# Patient Record
Sex: FEMALE | NOT HISPANIC OR LATINO | Employment: UNEMPLOYED | URBAN - METROPOLITAN AREA
[De-identification: names, ages, dates, MRNs, and addresses within clinical notes are randomized per-mention and may not be internally consistent; named-entity substitution may affect disease eponyms.]

---

## 2017-02-18 ENCOUNTER — HOSPITAL ENCOUNTER (EMERGENCY)
Facility: HOSPITAL | Age: 42
Discharge: HOME | End: 2017-02-19
Attending: EMERGENCY MEDICINE | Admitting: EMERGENCY MEDICINE

## 2017-02-18 ENCOUNTER — APPOINTMENT (OUTPATIENT)
Dept: GENERAL RADIOLOGY | Facility: HOSPITAL | Age: 42
End: 2017-02-18

## 2017-02-18 DIAGNOSIS — R00.2 PALPITATIONS: Primary | ICD-10-CM

## 2017-02-18 DIAGNOSIS — R07.89 ATYPICAL CHEST PAIN: ICD-10-CM

## 2017-02-18 LAB
ALBUMIN SERPL-MCNC: 4.5 G/DL (ref 3.5–5.2)
ALBUMIN/GLOB SERPL: 1.4 G/DL
ALP SERPL-CCNC: 46 U/L (ref 39–117)
ALT SERPL W P-5'-P-CCNC: 13 U/L (ref 1–33)
ANION GAP SERPL CALCULATED.3IONS-SCNC: 12.5 MMOL/L
AST SERPL-CCNC: 18 U/L (ref 1–32)
BACTERIA UR QL AUTO: ABNORMAL /HPF
BASOPHILS # BLD AUTO: 0.05 10*3/MM3 (ref 0–0.2)
BASOPHILS NFR BLD AUTO: 0.7 % (ref 0–1.5)
BILIRUB SERPL-MCNC: 0.2 MG/DL (ref 0.1–1.2)
BILIRUB UR QL STRIP: NEGATIVE
BUN BLD-MCNC: 18 MG/DL (ref 6–20)
BUN/CREAT SERPL: 22.2 (ref 7–25)
CALCIUM SPEC-SCNC: 9.2 MG/DL (ref 8.6–10.5)
CHLORIDE SERPL-SCNC: 101 MMOL/L (ref 98–107)
CLARITY UR: CLEAR
CO2 SERPL-SCNC: 26.5 MMOL/L (ref 22–29)
COLOR UR: YELLOW
CREAT BLD-MCNC: 0.81 MG/DL (ref 0.57–1)
D DIMER PPP FEU-MCNC: 0.33 MCGFEU/ML (ref 0–0.49)
DEPRECATED RDW RBC AUTO: 41.2 FL (ref 37–54)
EOSINOPHIL # BLD AUTO: 0.13 10*3/MM3 (ref 0–0.7)
EOSINOPHIL NFR BLD AUTO: 1.9 % (ref 0.3–6.2)
ERYTHROCYTE [DISTWIDTH] IN BLOOD BY AUTOMATED COUNT: 12.3 % (ref 11.7–13)
GFR SERPL CREATININE-BSD FRML MDRD: 78 ML/MIN/1.73
GLOBULIN UR ELPH-MCNC: 3.3 GM/DL
GLUCOSE BLD-MCNC: 108 MG/DL (ref 65–99)
GLUCOSE BLDC GLUCOMTR-MCNC: 114 MG/DL (ref 70–130)
GLUCOSE UR STRIP-MCNC: NEGATIVE MG/DL
HCG SERPL QL: NEGATIVE
HCT VFR BLD AUTO: 38.6 % (ref 35.6–45.5)
HGB BLD-MCNC: 13.3 G/DL (ref 11.9–15.5)
HGB UR QL STRIP.AUTO: ABNORMAL
HYALINE CASTS UR QL AUTO: ABNORMAL /LPF
IMM GRANULOCYTES # BLD: 0 10*3/MM3 (ref 0–0.03)
IMM GRANULOCYTES NFR BLD: 0 % (ref 0–0.5)
KETONES UR QL STRIP: NEGATIVE
LEUKOCYTE ESTERASE UR QL STRIP.AUTO: NEGATIVE
LYMPHOCYTES # BLD AUTO: 2.21 10*3/MM3 (ref 0.9–4.8)
LYMPHOCYTES NFR BLD AUTO: 32.8 % (ref 19.6–45.3)
MCH RBC QN AUTO: 31.5 PG (ref 26.9–32)
MCHC RBC AUTO-ENTMCNC: 34.5 G/DL (ref 32.4–36.3)
MCV RBC AUTO: 91.5 FL (ref 80.5–98.2)
MONOCYTES # BLD AUTO: 0.3 10*3/MM3 (ref 0.2–1.2)
MONOCYTES NFR BLD AUTO: 4.5 % (ref 5–12)
NEUTROPHILS # BLD AUTO: 4.05 10*3/MM3 (ref 1.9–8.1)
NEUTROPHILS NFR BLD AUTO: 60.1 % (ref 42.7–76)
NITRITE UR QL STRIP: NEGATIVE
PH UR STRIP.AUTO: 7 [PH] (ref 5–8)
PLATELET # BLD AUTO: 323 10*3/MM3 (ref 140–500)
PMV BLD AUTO: 10.1 FL (ref 6–12)
POTASSIUM BLD-SCNC: 4 MMOL/L (ref 3.5–5.2)
PROT SERPL-MCNC: 7.8 G/DL (ref 6–8.5)
PROT UR QL STRIP: NEGATIVE
RBC # BLD AUTO: 4.22 10*6/MM3 (ref 3.9–5.2)
RBC # UR: ABNORMAL /HPF
REF LAB TEST METHOD: ABNORMAL
SODIUM BLD-SCNC: 140 MMOL/L (ref 136–145)
SP GR UR STRIP: 1.01 (ref 1–1.03)
SQUAMOUS #/AREA URNS HPF: ABNORMAL /HPF
TROPONIN T SERPL-MCNC: <0.01 NG/ML (ref 0–0.03)
TROPONIN T SERPL-MCNC: <0.01 NG/ML (ref 0–0.03)
UROBILINOGEN UR QL STRIP: ABNORMAL
WBC NRBC COR # BLD: 6.74 10*3/MM3 (ref 4.5–10.7)
WBC UR QL AUTO: ABNORMAL /HPF

## 2017-02-18 PROCEDURE — 82962 GLUCOSE BLOOD TEST: CPT

## 2017-02-18 PROCEDURE — 93005 ELECTROCARDIOGRAM TRACING: CPT | Performed by: PHYSICIAN ASSISTANT

## 2017-02-18 PROCEDURE — 85379 FIBRIN DEGRADATION QUANT: CPT | Performed by: PHYSICIAN ASSISTANT

## 2017-02-18 PROCEDURE — 36415 COLL VENOUS BLD VENIPUNCTURE: CPT

## 2017-02-18 PROCEDURE — 81001 URINALYSIS AUTO W/SCOPE: CPT | Performed by: EMERGENCY MEDICINE

## 2017-02-18 PROCEDURE — 85025 COMPLETE CBC W/AUTO DIFF WBC: CPT | Performed by: EMERGENCY MEDICINE

## 2017-02-18 PROCEDURE — 99284 EMERGENCY DEPT VISIT MOD MDM: CPT

## 2017-02-18 PROCEDURE — 96374 THER/PROPH/DIAG INJ IV PUSH: CPT

## 2017-02-18 PROCEDURE — 93005 ELECTROCARDIOGRAM TRACING: CPT

## 2017-02-18 PROCEDURE — 71020 HC CHEST PA AND LATERAL: CPT

## 2017-02-18 PROCEDURE — 25010000002 LORAZEPAM PER 2 MG: Performed by: EMERGENCY MEDICINE

## 2017-02-18 PROCEDURE — 80053 COMPREHEN METABOLIC PANEL: CPT | Performed by: EMERGENCY MEDICINE

## 2017-02-18 PROCEDURE — 36415 COLL VENOUS BLD VENIPUNCTURE: CPT | Performed by: EMERGENCY MEDICINE

## 2017-02-18 PROCEDURE — 93010 ELECTROCARDIOGRAM REPORT: CPT | Performed by: INTERNAL MEDICINE

## 2017-02-18 PROCEDURE — 84484 ASSAY OF TROPONIN QUANT: CPT | Performed by: PHYSICIAN ASSISTANT

## 2017-02-18 PROCEDURE — 84703 CHORIONIC GONADOTROPIN ASSAY: CPT | Performed by: EMERGENCY MEDICINE

## 2017-02-18 RX ORDER — LORAZEPAM 2 MG/ML
0.5 INJECTION INTRAMUSCULAR ONCE
Status: COMPLETED | OUTPATIENT
Start: 2017-02-18 | End: 2017-02-18

## 2017-02-18 RX ADMIN — LORAZEPAM 0.5 MG: 2 INJECTION INTRAMUSCULAR; INTRAVENOUS at 23:02

## 2017-02-19 ENCOUNTER — APPOINTMENT (OUTPATIENT)
Dept: CARDIOLOGY | Facility: HOSPITAL | Age: 42
End: 2017-02-19

## 2017-02-19 VITALS
TEMPERATURE: 97.3 F | RESPIRATION RATE: 16 BRPM | DIASTOLIC BLOOD PRESSURE: 78 MMHG | HEART RATE: 75 BPM | OXYGEN SATURATION: 100 % | SYSTOLIC BLOOD PRESSURE: 100 MMHG | BODY MASS INDEX: 28.47 KG/M2 | WEIGHT: 145 LBS | HEIGHT: 60 IN

## 2017-02-19 PROCEDURE — 0296T HC EXT ECG > 48HR TO 21 DAY RCRD W/CONECT INTL RCRD: CPT

## 2017-02-19 PROCEDURE — 96361 HYDRATE IV INFUSION ADD-ON: CPT

## 2017-02-19 RX ADMIN — SODIUM CHLORIDE 1000 ML: 9 INJECTION, SOLUTION INTRAVENOUS at 00:02

## 2017-02-19 NOTE — ED PROVIDER NOTES
" EMERGENCY DEPARTMENT ENCOUNTER    CHIEF COMPLAINT  Chief Complaint: chest pain  History given by: patient  History limited by: none  Room Number: 03/03  PMD: No Known Provider      HPI:  Pt is a 41 y.o. female who presents complaining of lower central chest pain which radiates to her back onset today while shopping at the mall today around 5:30 PM with shortness of breath and fast palpitations. She intermittently has some numbness in her distal upper extremities as well. She describes the pain as \"pressure\" and is worse with deep breathing. She states that she also feels generally weak. EMS reported that the pt was hyperventilating on scene. Pt's only home medication is Progesterone.    Duration:  4 hours  Onset: gradual  Timing: intermittent  Location: lower central chest  Radiation: back  Quality: \"pressure\"  Intensity/Severity: severe  Progression: waxing and waning  Associated Symptoms: shortness of breath, numbness distal BUE, generally weak, palpitations  Aggravating Factors: deep breathing  Alleviating Factors: none stated  Previous Episodes: none  Treatment before arrival: none stated    PAST MEDICAL HISTORY  Active Ambulatory Problems     Diagnosis Date Noted   • No Active Ambulatory Problems     Resolved Ambulatory Problems     Diagnosis Date Noted   • No Resolved Ambulatory Problems     No Additional Past Medical History       PAST SURGICAL HISTORY  History reviewed. No pertinent past surgical history.    FAMILY HISTORY  History reviewed. No pertinent family history.    SOCIAL HISTORY  Social History     Social History   • Marital status:      Spouse name: N/A   • Number of children: N/A   • Years of education: N/A     Occupational History   • Not on file.     Social History Main Topics   • Smoking status: Not on file   • Smokeless tobacco: Not on file   • Alcohol use Not on file   • Drug use: Not on file   • Sexual activity: Not on file     Other Topics Concern   • Not on file     Social History " Narrative   • No narrative on file       ALLERGIES  Review of patient's allergies indicates no known allergies.    REVIEW OF SYSTEMS  Review of Systems   Constitutional: Negative for chills and fever.   HENT: Negative for congestion and sore throat.    Eyes: Negative.    Respiratory: Positive for shortness of breath. Negative for cough.    Cardiovascular: Positive for chest pain and palpitations (HR in the 150s at EMS arrival per pt).   Gastrointestinal: Negative for abdominal pain, diarrhea and vomiting.   Genitourinary: Negative for dysuria.   Musculoskeletal: Negative for neck pain.   Skin: Negative for rash.   Allergic/Immunologic: Negative.    Neurological: Positive for numbness (distal BUE). Negative for weakness and headaches.   Hematological: Negative.    Psychiatric/Behavioral: The patient is nervous/anxious.    All other systems reviewed and are negative.      PHYSICAL EXAM  ED Triage Vitals   Temp Heart Rate Resp BP SpO2   02/18/17 1828 02/18/17 1828 02/18/17 1828 02/18/17 1828 02/18/17 1828   97.7 °F (36.5 °C) 89 16 120/74 96 %      Temp src Heart Rate Source Patient Position BP Location FiO2 (%)   -- 02/18/17 2049 02/18/17 2049 02/18/17 2049 --    Monitor Lying Right arm        Physical Exam   Constitutional: She is oriented to person, place, and time and well-developed, well-nourished, and in no distress.   Eyes: EOM are normal.   Neck: Normal range of motion.   Cardiovascular: Regular rhythm.  Tachycardia present.    Pulmonary/Chest: Effort normal and breath sounds normal. Tachypnea noted. No respiratory distress.   Neurological: She is alert and oriented to person, place, and time. She has normal sensation and normal strength.   Skin: Skin is warm and dry.   Psychiatric: Affect normal. Her mood appears anxious.   Nursing note and vitals reviewed.      LAB RESULTS  Lab Results (last 24 hours)     Procedure Component Value Units Date/Time    CBC & Differential [[id number]] Collected:  02/18/17 1933     Specimen:  Blood Updated:  02/18/17 2004    Narrative:       The following orders were created for panel order CBC & Differential.  Procedure                               Abnormality         Status                     ---------                               -----------         ------                     CBC Auto Differential[[id number]]         Abnormal            Final result                 Please view results for these tests on the individual orders.    Comprehensive Metabolic Panel [[id number]]  (Abnormal) Collected:  02/18/17 1933    Specimen:  Blood Updated:  02/18/17 2031     Glucose 108 (H) mg/dL      BUN 18 mg/dL      Creatinine 0.81 mg/dL      Sodium 140 mmol/L      Potassium 4.0 mmol/L      Chloride 101 mmol/L      CO2 26.5 mmol/L      Calcium 9.2 mg/dL      Total Protein 7.8 g/dL      Albumin 4.50 g/dL      ALT (SGPT) 13 U/L      AST (SGOT) 18 U/L      Alkaline Phosphatase 46 U/L      Total Bilirubin 0.2 mg/dL      eGFR Non African Amer 78 mL/min/1.73      Globulin 3.3 gm/dL      A/G Ratio 1.4 g/dL      BUN/Creatinine Ratio 22.2      Anion Gap 12.5 mmol/L     hCG, Serum, Qualitative [[id number]]  (Normal) Collected:  02/18/17 1933    Specimen:  Blood Updated:  02/18/17 2031     HCG Qualitative Negative     CBC Auto Differential [[id number]]  (Abnormal) Collected:  02/18/17 1933    Specimen:  Blood Updated:  02/18/17 2004     WBC 6.74 10*3/mm3      RBC 4.22 10*6/mm3      Hemoglobin 13.3 g/dL      Hematocrit 38.6 %      MCV 91.5 fL      MCH 31.5 pg      MCHC 34.5 g/dL      RDW 12.3 %      RDW-SD 41.2 fl      MPV 10.1 fL      Platelets 323 10*3/mm3      Neutrophil % 60.1 %      Lymphocyte % 32.8 %      Monocyte % 4.5 (L) %      Eosinophil % 1.9 %      Basophil % 0.7 %      Immature Grans % 0.0 %      Neutrophils, Absolute 4.05 10*3/mm3      Lymphocytes, Absolute 2.21 10*3/mm3      Monocytes, Absolute 0.30 10*3/mm3      Eosinophils, Absolute 0.13 10*3/mm3      Basophils, Absolute 0.05 10*3/mm3      Immature Grans,  Absolute 0.00 10*3/mm3     Troponin [[id number]]  (Normal) Collected:  02/18/17 1933    Specimen:  Blood Updated:  02/18/17 2122     Troponin T <0.010 ng/mL     Narrative:       Troponin T Reference Ranges:  Less than 0.03 ng/mL:    Negative for AMI  0.03 to 0.09 ng/mL:      Indeterminant for AMI  Greater than 0.09 ng/mL: Positive for AMI    Urinalysis With / Culture If Indicated [[id number]]  (Abnormal) Collected:  02/18/17 2057    Specimen:  Urine from Urine, Clean Catch Updated:  02/18/17 2110     Color, UA Yellow      Appearance, UA Clear      pH, UA 7.0      Specific Gravity, UA 1.009      Glucose, UA Negative      Ketones, UA Negative      Bilirubin, UA Negative      Blood, UA Moderate (2+) (A)      Protein, UA Negative      Leuk Esterase, UA Negative      Nitrite, UA Negative      Urobilinogen, UA 0.2 E.U./dL     Urinalysis, Microscopic Only [[id number]]  (Abnormal) Collected:  02/18/17 2057    Specimen:  Urine from Urine, Clean Catch Updated:  02/18/17 2110     RBC, UA 0-2 (A) /HPF      WBC, UA 0-2 (A) /HPF      Bacteria, UA None Seen /HPF      Squamous Epithelial Cells, UA 0-2 /HPF      Hyaline Casts, UA None Seen /LPF      Methodology Automated Microscopy     POC Glucose Fingerstick [[id number]]  (Normal) Collected:  02/18/17 2132    Specimen:  Blood Updated:  02/18/17 2133     Glucose 114 mg/dL     Narrative:       Meter: [id number] : [id number] [facility]    D-dimer, Quantitative [[id number]]  (Normal) Collected:  02/18/17 2150    Specimen:  Blood Updated:  02/18/17 2218     D-Dimer, Quantitative 0.33 MCGFEU/mL     Narrative:       The Stago D-Dimer test used in conjunction with a clinical pretest probability (PTP) assessment model, has been approved by the FDA to rule out the presence of venous thromboembolism (VTE) in outpatients suspected of deep venous thrombosis (DVT) or pulmonary embolism (PE).     Troponin [[id number]]  (Normal) Collected:  02/18/17 2219    Specimen:  Blood from Arm, Right  Updated:  02/18/17 2248     Troponin T <0.010 ng/mL     Narrative:       Troponin T Reference Ranges:  Less than 0.03 ng/mL:    Negative for AMI  0.03 to 0.09 ng/mL:      Indeterminant for AMI  Greater than 0.09 ng/mL: Positive for AMI          I ordered the above labs and reviewed the results    RADIOLOGY  XR Chest 2 View   Final Result   Negative acute disease.      I ordered the above noted radiological studies. Interpreted by radiologist. Reviewed by me in PACS.       PROCEDURES  Procedures    EKG         EKG time: 6:52 PM  Rhythm/Rate: Sinus arrhythmia, rate 78  P waves and MS: normal  QRS, axis: normal QT   ST and T waves: normal   Interpreted Contemporaneously by me, independently viewed  No prior EKG available for comparison    EKG         EKG time: 9:27 PM  Rhythm/Rate: NSR, rate 91  P waves and MS: normal  QRS, axis: normal   ST and T waves: normal   Interpreted Contemporaneously by me, independently viewed  Unchanged compared to prior this date      PROGRESS AND CONSULTS  ED Course     9:02 PM: Pt is tachycardic in the 120s and 130s upon entrance to exam room. Second EKG was ordered however by the time she was hooked up, HR had returned to the 90s and 100s. Will continue to monitor. Will order labs including troponin and d-dimer for further evaluation.    10:04 PM: Pt rechecked, she is resting comfortably at the moment. HR in the 80s, sats 99% and latest /84. Informed her lab results are pending, however initial troponin is negatvie and EKG shows no acute changes.    10:57 PM: Discussed the case with assigned attending Dr. Williamson who agrees with the plan of care, likely dx of panic attack.    11:28 PM: Pt rechecked, she is sleeping at current. HR in the 60s and 70s. She had improvement after Ativan. Discussed negative workup and intent for discharge with Holter monitor for further analysis. Pt and family understand and agree with the plan. All questions answered.    11:40 PM: Discussed the case with  assigned attending Dr. Williamson. He agrees with plan for discharge, will d/c after he evaluates the pt.      MEDICAL DECISION MAKING  Results were reviewed/discussed with the patient and they were also made aware of online access. Pt also made aware that some labs, such as cultures, will not be resulted during ER visit and follow up with PMD is necessary.     MDM  Number of Diagnoses or Management Options     Amount and/or Complexity of Data Reviewed  Clinical lab tests: ordered and reviewed (Initial and second troponin are both negative, d-dimer WNL.)  Tests in the radiology section of CPT®: reviewed and ordered (CXR shows no acute disease)  Tests in the medicine section of CPT®: ordered and reviewed  Independent visualization of images, tracings, or specimens: yes    Patient Progress  Patient progress: stable         DIAGNOSIS  Final diagnoses:   Palpitations   Atypical chest pain       DISPOSITION  DISCHARGED @ 12:31 AM    Patient discharged in stable condition.    Reviewed implications of results, diagnosis, meds, responsibility to follow up, warning signs and symptoms of possible worsening, potential complications and reasons to return to ER.    Patient/Family voiced understanding of above instructions.    Discussed plan for discharge, as there is no emergent indication for admission.  Pt/family is agreeable and understands need for follow up and repeat testing.  Pt is aware that discharge does not mean that nothing is wrong but it indicates no emergency is present that requires admission and they must continue care with follow-up as given below or physician of their choice.     FOLLOW-UP  Vignesh Corbett MD  [address] [id number] [phone]    Schedule an appointment as soon as possible for a visit  For further evaluation and treatment       Medication List      Notice     No changes were made to your prescriptions during this visit.      Latest Documented Vital Signs:  As of 2:28  AM  BP- 100/78 HR- 75 Temp- 97.3 °F (36.3 °C) (Tympanic) O2 sat- 100%    --  Documentation assistance provided by lauren Melo III, PA for Glenn Melo PA-C.  Information recorded by the scribe was done at my direction and has been verified and validated by me.       Davi Hanson  02/19/17 0031       RAMIREZ Morris III  02/19/17 0228

## 2017-02-19 NOTE — ED PROVIDER NOTES
The patient presents complaining of chest pain and palpitations. Pt states that the symptoms have since resolved. Pt reports SOA. Pt denies a hx of anxiety and panic attacks. Pt denies abdominal pain.    Pt is resting comfortably and is in no distress with no gross neuro deficits.  Patient is nontoxic appearing   Lungs/cardiovascular: Lungs: clear Heart: within normal limits without murmur  Abdomen: Soft, non tender  Back/extremities: Calves are without swelling and non tender      I supervised care provided by the midlevel provider.  We have discussed this patient's history, physical exam, and treatment plan.  I have reviewed the note and personally saw and examined the patient and agree with the plan of care.    Entered by David Ron, acting as scribe for Usman Williamson MD.         David Ron  02/19/17 0013       Usman Williamson MD  02/19/17 0234

## 2017-02-19 NOTE — ED NOTES
Pt c/o feeling like her heart was racing and then she became dizzy, SOA, had chest pain, and bilateral upper extremities and head felt numb. Pt states that last about 10 minutes and occurred while she was walking around the mall. Pt denies any symptoms at this time.     Kati Russo RN  02/18/17 2051

## 2017-02-19 NOTE — DISCHARGE INSTRUCTIONS
Wear 24 hour holter monitor.  Call and follow up with Dr Corbett to review results.  Avoid Caffeine until resolve of all symptoms.  Return to the ER with any further concerns or should your symptoms return.

## 2017-02-24 ENCOUNTER — APPOINTMENT (OUTPATIENT)
Dept: LAB | Facility: HOSPITAL | Age: 42
End: 2017-02-24

## 2017-02-24 ENCOUNTER — OFFICE VISIT (OUTPATIENT)
Dept: CARDIOLOGY | Facility: CLINIC | Age: 42
End: 2017-02-24

## 2017-02-24 VITALS
HEIGHT: 62 IN | DIASTOLIC BLOOD PRESSURE: 72 MMHG | SYSTOLIC BLOOD PRESSURE: 118 MMHG | WEIGHT: 153.6 LBS | HEART RATE: 66 BPM | BODY MASS INDEX: 28.26 KG/M2

## 2017-02-24 DIAGNOSIS — R01.1 HEART MURMUR: ICD-10-CM

## 2017-02-24 DIAGNOSIS — R06.02 SHORTNESS OF BREATH: ICD-10-CM

## 2017-02-24 DIAGNOSIS — R00.0 TACHYCARDIA: ICD-10-CM

## 2017-02-24 DIAGNOSIS — R07.89 OTHER CHEST PAIN: ICD-10-CM

## 2017-02-24 DIAGNOSIS — R00.2 PALPITATIONS: Primary | ICD-10-CM

## 2017-02-24 LAB
T4 FREE SERPL-MCNC: 1.03 NG/DL (ref 0.93–1.7)
TSH SERPL DL<=0.05 MIU/L-ACNC: 2.58 MIU/ML (ref 0.27–4.2)

## 2017-02-24 PROCEDURE — 84439 ASSAY OF FREE THYROXINE: CPT | Performed by: NURSE PRACTITIONER

## 2017-02-24 PROCEDURE — 84443 ASSAY THYROID STIM HORMONE: CPT | Performed by: NURSE PRACTITIONER

## 2017-02-24 PROCEDURE — 93000 ELECTROCARDIOGRAM COMPLETE: CPT | Performed by: NURSE PRACTITIONER

## 2017-02-24 PROCEDURE — 99204 OFFICE O/P NEW MOD 45 MIN: CPT | Performed by: NURSE PRACTITIONER

## 2017-02-24 NOTE — PROGRESS NOTES
Date of Office Visit: 2017  Encounter Provider: JONNIE Spear  Place of Service: [facility] CARDIOLOGY  Patient Name: Mile Welsh  :1975    Chief Complaint   Patient presents with   • Chest Pain     ER FOLLOW UP   • Shortness of Breath   • Palpitations   :     HPI: Mile Welsh is a 42 y.o. female who presents today for evaluation of chest pain, shortness of breath, and palpitations.  The patient had been at the shopping mall and developed these symptoms.  She also reported some intermittent numbness or distal upper extremities.  She described the chest pain as pressure and it worsened with deep breathing.  She was feeling generally weak.  EMS reported the patient started hyperventilating on the scene and that her heart rate was in the 150s.  The patient was taken to [facility] emergency department for further evaluation.    Upon review of the emergency department records, her blood pressure was 120/74 and heart rate 89 bpm.  Comprehensive metabolic panel was within normal limits.  Her CBC was within normal limits and troponin level.  She was noted to have a moderate amount of blood in her urine analysis.  Quantitative d-dimer was negative.  Her EKG was interpreted as sinus arrhythmia with a heart rate of 78 bpm and another one was completed which showed normal sinus rhythm with heart rate of 91 bpm.  The ED physician noted the patient was tachycardic with heart rates in the 120s and 130s upon arrival.  Her chest x-ray was normal.  Her heart rate decreased to the 80s and her blood pressure was 118/84.  Her symptoms were felt likely secondary to a panic attack and she was given Ativan.  She was discharged on a Zio patch.    The patient presents a for follow-up.  She said the only thing different that morning was that she drink a strong cup of coffee from [employer].  She did feel that her heartbeat was aggravated the entire day.  She was just shopping and  developed tachycardia accompanied with mild chest pain, shortness of breath, lightheadedness, and her body felt numb.  I reviewed the EMS strips and they were not able to capture a rhythm strip with her heart rate in the 140s and 150s.  I did see a rhythm strip with a heart rate in the 120s.  The patient states that this is never happened to her before.  She denied any use of decongestants or illicit drug use.  She states that she does have a history of thyroid issues.    She denies any further palpitations.  She says she's just felt fatigued since the incident.  She denies chest pain, shortness of air, paroxysmal nocturnal dyspnea, orthopnea, cough, edema, dizziness, or syncope.      Past Medical History   Diagnosis Date   • Murmur    • Palpitations      2/18/2017   • Thyroid disease        Past Surgical History   Procedure Laterality Date   • No past surgeries         Social History     Social History   • Marital status:      Spouse name: N/A   • Number of children: N/A   • Years of education: N/A     Occupational History   • Not on file.     Social History Main Topics   • Smoking status: Former Smoker   • Smokeless tobacco: Not on file      Comment: she just occ smoked over the years when she felt she needed it   • Alcohol use No   • Drug use: No   • Sexual activity: Not on file     Other Topics Concern   • Not on file     Social History Narrative       Family History   Problem Relation Age of Onset   • No Known Problems Mother    • No Known Problems Father        Review of Systems   Constitution: Negative for chills, diaphoresis, fever, malaise/fatigue, night sweats, weight gain and weight loss.   HENT: Negative for hearing loss, nosebleeds, sore throat and tinnitus.    Eyes: Negative for blurred vision, double vision, pain and visual disturbance.   Cardiovascular: Positive for chest pain and palpitations. Negative for claudication, cyanosis, dyspnea on exertion, irregular heartbeat, leg swelling,  "near-syncope, orthopnea, paroxysmal nocturnal dyspnea and syncope.   Respiratory: Negative for cough, hemoptysis, shortness of breath, snoring and wheezing.    Endocrine: Negative for cold intolerance, heat intolerance and polyuria.   Hematologic/Lymphatic: Negative for bleeding problem. Does not bruise/bleed easily.   Skin: Negative for color change, dry skin, flushing and itching.   Musculoskeletal: Negative for falls, joint pain, joint swelling, muscle cramps, muscle weakness and myalgias.   Gastrointestinal: Negative for abdominal pain, constipation, heartburn, melena, nausea and vomiting.   Genitourinary: Negative for dysuria and hematuria.   Neurological: Negative for excessive daytime sleepiness, dizziness, light-headedness, loss of balance, numbness, paresthesias, seizures and vertigo.   Psychiatric/Behavioral: Negative for altered mental status, depression, memory loss and substance abuse. The patient does not have insomnia and is not nervous/anxious.    Allergic/Immunologic: Negative for environmental allergies.       No Known Allergies      Current Outpatient Prescriptions:   •  PROGESTERONE IM, Inject  into the shoulder, thigh, or buttocks., Disp: , Rfl:      Objective:     Vitals:    02/24/17 1108 02/24/17 1112   BP: 118/72 118/72   BP Location: Left arm Right arm   Pulse: 66    Weight: 153 lb 9.6 oz (69.7 kg)    Height: 62\" (157.5 cm)      Body mass index is 28.09 kg/(m^2).    PHYSICAL EXAM:    Vitals Reviewed.   General Appearance: No acute distress, well developed and well nourished.   Eyes: Conjunctiva and lids: No erythema, swelling, or discharge. Sclera non-icteric.   HENT: Atraumatic, normocephalic. External eyes, ears, and nose normal. No hearing loss noted. Mucous membranes normal. Lips not cyanotic. Neck supple with no tenderness.  Respiratory: No signs of respiratory distress. Respiration rhythm and depth normal.   Clear to auscultation. No rales, crackles, rhonchi, or wheezing auscultated. "   Cardiovascular:  Jugular Venous Pressure: Normal  Heart Rate and Rhythm: Normal, Heart Sounds: Normal S1 and S2. No S3 or S4 noted.  Murmurs: LUSB grade 1/6 murmur noted. No rubs, thrills, or gallops.   Arterial Pulses: Carotid pulses normal. No carotid bruit noted. Posterior tibialis and dorsalis pedis pulses normal.   Lower Extremities: No edema noted.  Gastrointestinal:  Abdomen soft, non-distended, non-tender. Normal bowel sounds. No hepatomegaly.   Musculoskeletal: Normal movement of extremities  Skin and Nails: General appearance normal. No pallor, cyanosis, diaphoresis. Skin temperature normal. No clubbing of fingernails.   Psychiatric: Patient alert and oriented to person, place, and time. Speech and behavior appropriate. Normal mood and affect.       ECG 12 Lead  Date/Time: 2/24/2017 11:12 AM  Performed by: MISHA ANDUJAR  Authorized by: MISHA ANDUJAR   Comparison: compared with previous ECG from 2/18/2017  Similar to previous ECG  Rhythm: sinus rhythm  Rate: normal  BPM: 66  Conduction: conduction normal  ST Segments: ST segments normal  T Waves: T waves normal  QRS axis: normal  Other: no other findings  Clinical impression: normal ECG              Assessment:       Diagnosis Plan   1. Palpitations  ECG 12 Lead    Adult Transthoracic Echo Complete    TSH    T4, Free   2. Tachycardia  ECG 12 Lead    Adult Transthoracic Echo Complete    TSH    T4, Free   3. Other chest pain  ECG 12 Lead    Adult Transthoracic Echo Complete    TSH    T4, Free   4. Shortness of breath  ECG 12 Lead    Adult Transthoracic Echo Complete    TSH    T4, Free   5. Heart murmur  TSH    T4, Free          Plan:       1.  Palpitations: The patient states that she had one episode of palpitations and was told initially that her heart rate was in the 140s and 150s.  She does been walking around the mall.  She felt the tachycardia, and mild chest pain, shortness of breath, lightheadedness, and felt numb.  When she arrived to the  emergency department she was sinus tachycardia with rates in the 120s.  The only thing that was different that day was that she had a strong cup of coffee from [employer].  She is currently avoiding any caffeine area and she states that she does have thyroid issues and I will check a TSH and free T4.  She denies any use of alcohol, decongestants, or illicit drug use.  She is turning and the Zio patch that was placed in the emergency department.  I will review those tests results and follow-up with the patient.  It sounds as if she may have had an episode of paroxysmal atrial arrhythmia.  I've asked the patient to contact our office if she has another episode of palpitations.  If she is having active tachycardia she could come in for an EKG. I explained that we would want to capture the heart arrhythmia that she is feeling.    2.  Heart Murmur: The patient was noted to have a mild heart murmur in the left upper sternal border.  I've recommended that she have a 2-D echocardiogram for further evaluation.    The plan of care was discussed with Dr. Gregory Salas and she agrees.     As always, it has been a pleasure to participate in your patient's care.      Sincerely,         JONNIE Beatty

## 2017-02-24 NOTE — PATIENT INSTRUCTIONS
Palpitations    A palpitation is the feeling that your heartbeat is irregular or is faster than normal. It may feel like your heart is fluttering or skipping a beat. Palpitations are usually not a serious problem. However, in some cases, you may need further medical evaluation.    CAUSES   Palpitations can be caused by:  · Smoking.  · Caffeine or other stimulants, such as diet pills or energy drinks.  · Alcohol.  · Stress and anxiety.  · Strenuous physical activity.  · Fatigue.  · Certain medicines.  · Heart disease, especially if you have a history of irregular heart rhythms (arrhythmias), such as atrial fibrillation, atrial flutter, or supraventricular tachycardia.  · An improperly working pacemaker or defibrillator.  ·   DIAGNOSIS   To find the cause of your palpitations, your health care provider will take your medical history and perform a physical exam. Your health care provider may also have you take a test called an ambulatory electrocardiogram (ECG). An ECG records your heartbeat patterns over a 24-hour period. You may also have other tests, such as:  · Transthoracic echocardiogram (TTE). During echocardiography, sound waves are used to evaluate how blood flows through your heart.  · Transesophageal echocardiogram (TOÑA).  · Cardiac monitoring. This allows your health care provider to monitor your heart rate and rhythm in real time.  · Holter monitor. This is a portable device that records your heartbeat and can help diagnose heart arrhythmias. It allows your health care provider to track your heart activity for several days, if needed.  · Stress tests by exercise or by giving medicine that makes the heart beat faster.  TREATMENT   Treatment of palpitations depends on the cause of your symptoms and can vary greatly. Most cases of palpitations do not require any treatment other than time, relaxation, and monitoring your symptoms. Other causes, such as atrial fibrillation, atrial flutter, or supraventricular  tachycardia, usually require further treatment.    HOME CARE INSTRUCTIONS   · Avoid:    Caffeinated coffee, tea, soft drinks, diet pills, and energy drinks.    Chocolate.    Alcohol.  · Stop smoking if you smoke.  · Reduce your stress and anxiety. Things that can help you relax include:    A method of controlling things in your body, such as your heartbeats, with your mind (biofeedback).    Yoga.    Meditation.    Physical activity such as swimming, jogging, or walking.  · Get plenty of rest and sleep.  SEEK MEDICAL CARE IF:   · You continue to have a fast or irregular heartbeat beyond 24 hours.  · Your palpitations occur more often.  SEEK IMMEDIATE MEDICAL CARE IF:  · You have chest pain or shortness of breath.  · You have a severe headache.  · You feel dizzy or you faint.  MAKE SURE YOU:  · Understand these instructions.  · Will watch your condition.  · Will get help right away if you are not doing well or get worse.     This information is not intended to replace advice given to you by your health care provider. Make sure you discuss any questions you have with your health care provider.     Document Released: 12/15/2001 Document Revised: 12/23/2014 Document Reviewed: 09/01/2016  [employer] Patient Education ©2016 [employer]

## 2017-02-27 ENCOUNTER — TELEPHONE (OUTPATIENT)
Dept: CARDIOLOGY | Facility: CLINIC | Age: 42
End: 2017-02-27

## 2017-02-27 NOTE — TELEPHONE ENCOUNTER
TSH and Free T4 normal. Patient informed.     Also, upon review of the ED records the patient was noted to have a moderate amount of hematuria.     I have left a message with her  to have the patient return my call.

## 2017-02-27 NOTE — TELEPHONE ENCOUNTER
----- Message from JONNIE Egan sent at 2/24/2017 12:01 PM EST -----    Follow up on TSH and Free T4

## 2017-02-27 NOTE — TELEPHONE ENCOUNTER
Patient informed about normal thyroid levels.     I asked her if she had any signs of urinary tract infection and she replied no.  I explained that she was noted to have a moderate amount of blood in her urine.  She said that was the last day of her menstruation.

## 2017-03-03 ENCOUNTER — HOSPITAL ENCOUNTER (OUTPATIENT)
Dept: CARDIOLOGY | Facility: HOSPITAL | Age: 42
Discharge: HOME | End: 2017-03-03
Admitting: NURSE PRACTITIONER

## 2017-03-03 VITALS
DIASTOLIC BLOOD PRESSURE: 68 MMHG | HEIGHT: 62 IN | HEART RATE: 59 BPM | BODY MASS INDEX: 28.16 KG/M2 | WEIGHT: 153 LBS | SYSTOLIC BLOOD PRESSURE: 108 MMHG

## 2017-03-03 DIAGNOSIS — R00.2 PALPITATIONS: ICD-10-CM

## 2017-03-03 DIAGNOSIS — R06.02 SHORTNESS OF BREATH: ICD-10-CM

## 2017-03-03 DIAGNOSIS — R00.0 TACHYCARDIA: ICD-10-CM

## 2017-03-03 DIAGNOSIS — R07.89 OTHER CHEST PAIN: ICD-10-CM

## 2017-03-03 LAB
BH CV ECHO MEAS - ACS: 1.8 CM
BH CV ECHO MEAS - AO MAX PG (FULL): 9.3 MMHG
BH CV ECHO MEAS - AO MAX PG: 12.8 MMHG
BH CV ECHO MEAS - AO MEAN PG (FULL): 5.9 MMHG
BH CV ECHO MEAS - AO MEAN PG: 7.8 MMHG
BH CV ECHO MEAS - AO ROOT AREA (BSA CORRECTED): 1.5
BH CV ECHO MEAS - AO ROOT AREA: 5.1 CM^2
BH CV ECHO MEAS - AO ROOT DIAM: 2.6 CM
BH CV ECHO MEAS - AO V2 MAX: 179.2 CM/SEC
BH CV ECHO MEAS - AO V2 MEAN: 132.8 CM/SEC
BH CV ECHO MEAS - AO V2 VTI: 37.9 CM
BH CV ECHO MEAS - ASC AORTA: 3 CM
BH CV ECHO MEAS - AVA(I,A): 1.5 CM^2
BH CV ECHO MEAS - AVA(I,D): 1.5 CM^2
BH CV ECHO MEAS - AVA(V,A): 1.4 CM^2
BH CV ECHO MEAS - AVA(V,D): 1.4 CM^2
BH CV ECHO MEAS - BSA(HAYCOCK): 1.8 M^2
BH CV ECHO MEAS - BSA: 1.7 M^2
BH CV ECHO MEAS - BZI_BMI: 28 KILOGRAMS/M^2
BH CV ECHO MEAS - BZI_METRIC_HEIGHT: 157.5 CM
BH CV ECHO MEAS - BZI_METRIC_WEIGHT: 69.4 KG
BH CV ECHO MEAS - CONTRAST EF (2CH): 61.3 ML/M^2
BH CV ECHO MEAS - CONTRAST EF 4CH: 60.4 ML/M^2
BH CV ECHO MEAS - EDV(CUBED): 87.4 ML
BH CV ECHO MEAS - EDV(MOD-SP2): 62 ML
BH CV ECHO MEAS - EDV(MOD-SP4): 48 ML
BH CV ECHO MEAS - EDV(TEICH): 89.5 ML
BH CV ECHO MEAS - EF(CUBED): 76.1 %
BH CV ECHO MEAS - EF(MOD-SP2): 61.3 %
BH CV ECHO MEAS - EF(MOD-SP4): 60.4 %
BH CV ECHO MEAS - EF(TEICH): 68.2 %
BH CV ECHO MEAS - ESV(CUBED): 20.9 ML
BH CV ECHO MEAS - ESV(MOD-SP2): 24 ML
BH CV ECHO MEAS - ESV(MOD-SP4): 19 ML
BH CV ECHO MEAS - ESV(TEICH): 28.4 ML
BH CV ECHO MEAS - FS: 37.9 %
BH CV ECHO MEAS - IVS/LVPW: 1.1
BH CV ECHO MEAS - IVSD: 0.82 CM
BH CV ECHO MEAS - LAT PEAK E' VEL: 12 CM/SEC
BH CV ECHO MEAS - LV DIASTOLIC VOL/BSA (35-75): 28.1 ML/M^2
BH CV ECHO MEAS - LV MASS(C)D: 108.8 GRAMS
BH CV ECHO MEAS - LV MASS(C)DI: 63.8 GRAMS/M^2
BH CV ECHO MEAS - LV MAX PG: 3.5 MMHG
BH CV ECHO MEAS - LV MEAN PG: 1.9 MMHG
BH CV ECHO MEAS - LV SYSTOLIC VOL/BSA (12-30): 11.1 ML/M^2
BH CV ECHO MEAS - LV V1 MAX: 94.1 CM/SEC
BH CV ECHO MEAS - LV V1 MEAN: 57.6 CM/SEC
BH CV ECHO MEAS - LV V1 VTI: 20.1 CM
BH CV ECHO MEAS - LVIDD: 4.4 CM
BH CV ECHO MEAS - LVIDS: 2.8 CM
BH CV ECHO MEAS - LVLD AP2: 7.6 CM
BH CV ECHO MEAS - LVLD AP4: 7.1 CM
BH CV ECHO MEAS - LVLS AP2: 6.6 CM
BH CV ECHO MEAS - LVLS AP4: 6.3 CM
BH CV ECHO MEAS - LVOT AREA (M): 2.8 CM^2
BH CV ECHO MEAS - LVOT AREA: 2.7 CM^2
BH CV ECHO MEAS - LVOT DIAM: 1.9 CM
BH CV ECHO MEAS - LVPWD: 0.75 CM
BH CV ECHO MEAS - MED PEAK E' VEL: 9 CM/SEC
BH CV ECHO MEAS - MV A DUR: 0.1 SEC
BH CV ECHO MEAS - MV A MAX VEL: 71.3 CM/SEC
BH CV ECHO MEAS - MV DEC SLOPE: 413.3 CM/SEC^2
BH CV ECHO MEAS - MV DEC TIME: 0.2 SEC
BH CV ECHO MEAS - MV E MAX VEL: 86.3 CM/SEC
BH CV ECHO MEAS - MV E/A: 1.2
BH CV ECHO MEAS - MV MAX PG: 4.3 MMHG
BH CV ECHO MEAS - MV MEAN PG: 2 MMHG
BH CV ECHO MEAS - MV P1/2T MAX VEL: 84.4 CM/SEC
BH CV ECHO MEAS - MV P1/2T: 59.8 MSEC
BH CV ECHO MEAS - MV V2 MAX: 104.2 CM/SEC
BH CV ECHO MEAS - MV V2 MEAN: 66.6 CM/SEC
BH CV ECHO MEAS - MV V2 VTI: 31.4 CM
BH CV ECHO MEAS - MVA P1/2T LCG: 2.6 CM^2
BH CV ECHO MEAS - MVA(P1/2T): 3.7 CM^2
BH CV ECHO MEAS - MVA(VTI): 1.8 CM^2
BH CV ECHO MEAS - PA ACC TIME: 0.14 SEC
BH CV ECHO MEAS - PA MAX PG (FULL): 2.1 MMHG
BH CV ECHO MEAS - PA MAX PG: 3.6 MMHG
BH CV ECHO MEAS - PA PR(ACCEL): 15.6 MMHG
BH CV ECHO MEAS - PA V2 MAX: 94.5 CM/SEC
BH CV ECHO MEAS - PULM A REVS DUR: 0.11 SEC
BH CV ECHO MEAS - PULM A REVS VEL: 29.1 CM/SEC
BH CV ECHO MEAS - PULM DIAS VEL: 47.5 CM/SEC
BH CV ECHO MEAS - PULM S/D: 1.2
BH CV ECHO MEAS - PULM SYS VEL: 59.2 CM/SEC
BH CV ECHO MEAS - PVA(V,A): 1.8 CM^2
BH CV ECHO MEAS - PVA(V,D): 1.8 CM^2
BH CV ECHO MEAS - QP/QS: 0.69
BH CV ECHO MEAS - RAP SYSTOLE: 8 MMHG
BH CV ECHO MEAS - RV MAX PG: 1.5 MMHG
BH CV ECHO MEAS - RV MEAN PG: 0.8 MMHG
BH CV ECHO MEAS - RV V1 MAX: 61.6 CM/SEC
BH CV ECHO MEAS - RV V1 MEAN: 40.9 CM/SEC
BH CV ECHO MEAS - RV V1 VTI: 13.5 CM
BH CV ECHO MEAS - RVOT AREA: 2.8 CM^2
BH CV ECHO MEAS - RVOT DIAM: 1.9 CM
BH CV ECHO MEAS - RVSP: 31.2 MMHG
BH CV ECHO MEAS - SI(AO): 113.5 ML/M^2
BH CV ECHO MEAS - SI(CUBED): 39 ML/M^2
BH CV ECHO MEAS - SI(LVOT): 32.3 ML/M^2
BH CV ECHO MEAS - SI(MOD-SP2): 22.3 ML/M^2
BH CV ECHO MEAS - SI(MOD-SP4): 17 ML/M^2
BH CV ECHO MEAS - SI(TEICH): 35.8 ML/M^2
BH CV ECHO MEAS - SUP REN AO DIAM: 1.4 CM
BH CV ECHO MEAS - SV(AO): 193.6 ML
BH CV ECHO MEAS - SV(CUBED): 66.5 ML
BH CV ECHO MEAS - SV(LVOT): 55.2 ML
BH CV ECHO MEAS - SV(MOD-SP2): 38 ML
BH CV ECHO MEAS - SV(MOD-SP4): 29 ML
BH CV ECHO MEAS - SV(RVOT): 38.3 ML
BH CV ECHO MEAS - SV(TEICH): 61.1 ML
BH CV ECHO MEAS - TAPSE (>1.6): 2.4 CM2
BH CV ECHO MEAS - TR MAX VEL: 241.1 CM/SEC
BH CV XLRA - RV BASE: 2.9 CM
BH CV XLRA - TDI S': 10 CM/SEC
E/E' RATIO: 8.5
LEFT ATRIUM VOLUME INDEX: 12 ML/M2
LV EF 2D ECHO EST: 60 %
SINUS: 2.7 CM
STJ: 3 CM

## 2017-03-03 PROCEDURE — 93306 TTE W/DOPPLER COMPLETE: CPT

## 2017-03-03 PROCEDURE — 93306 TTE W/DOPPLER COMPLETE: CPT | Performed by: INTERNAL MEDICINE

## 2017-03-07 ENCOUNTER — TELEPHONE (OUTPATIENT)
Dept: CARDIOLOGY | Facility: HOSPITAL | Age: 42
End: 2017-03-07

## 2017-03-07 NOTE — TELEPHONE ENCOUNTER
The patient a 2-D echocardiogram completed which showed ejection fraction of 60%, trace aortic valve regurgitation, trace mitral valve regurgitation, mild tricuspid valve regurgitation.    Zio patch monitor results pending.    I tried calling the phone number and is no longer in service.

## 2017-03-08 NOTE — TELEPHONE ENCOUNTER
I tried calling patient's number and it is no longer in service.  I tried the alternate phone number under Bear's name and there is no voicemail to leave a message.

## 2021-04-05 ENCOUNTER — OFFICE VISIT (OUTPATIENT)
Dept: FAMILY MEDICINE CLINIC | Facility: CLINIC | Age: 46
End: 2021-04-05

## 2021-04-05 VITALS
BODY MASS INDEX: 30.83 KG/M2 | WEIGHT: 174 LBS | RESPIRATION RATE: 18 BRPM | DIASTOLIC BLOOD PRESSURE: 77 MMHG | HEIGHT: 63 IN | SYSTOLIC BLOOD PRESSURE: 113 MMHG | TEMPERATURE: 97.3 F | HEART RATE: 71 BPM | OXYGEN SATURATION: 97 %

## 2021-04-05 DIAGNOSIS — R53.82 CHRONIC FATIGUE: Primary | ICD-10-CM

## 2021-04-05 DIAGNOSIS — F41.9 ANXIETY: ICD-10-CM

## 2021-04-05 DIAGNOSIS — Z12.31 ENCOUNTER FOR SCREENING MAMMOGRAM FOR BREAST CANCER: ICD-10-CM

## 2021-04-05 DIAGNOSIS — R63.5 WEIGHT GAIN: ICD-10-CM

## 2021-04-05 DIAGNOSIS — F32.89 OTHER DEPRESSION: ICD-10-CM

## 2021-04-05 PROCEDURE — 99213 OFFICE O/P EST LOW 20 MIN: CPT | Performed by: FAMILY MEDICINE

## 2021-04-05 RX ORDER — PRASTERONE (DHEA) 25 MG
1 CAPSULE ORAL
COMMUNITY

## 2021-04-05 RX ORDER — BUPROPION HYDROCHLORIDE 75 MG/1
75 TABLET ORAL 2 TIMES DAILY
Qty: 60 TABLET | Refills: 1 | Status: SHIPPED | OUTPATIENT
Start: 2021-04-05

## 2021-04-05 RX ORDER — LEVOTHYROXINE AND LIOTHYRONINE 9.5; 2.25 UG/1; UG/1
15 TABLET ORAL DAILY
COMMUNITY
End: 2021-04-06

## 2021-04-05 RX ORDER — BUSPIRONE HYDROCHLORIDE 5 MG/1
5 TABLET ORAL 3 TIMES DAILY
Qty: 45 TABLET | Refills: 0 | Status: SHIPPED | OUTPATIENT
Start: 2021-04-05

## 2021-04-05 RX ORDER — TESTOSTERONE 10 MG/.5G
GEL, METERED TOPICAL
COMMUNITY
Start: 2021-03-31

## 2021-04-05 NOTE — PROGRESS NOTES
Erin Welsh is a 46 y.o. female.     Chief Complaint   Patient presents with   • Establish Care   • Depression     poss hormone issue?   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 Worried about  due to location of his business  Family hx DM  Labs show a1c 5.7 ( prediabetes)  Discussed followed low carb diet. Information give  The following portions of the patient's history were reviewed and updated as appropriate: allergies, current medications, past family history, past medical history, past social history, past surgical history and problem list.    Past Medical History:   Diagnosis Date   • Anxiety    • Gestational diabetes    • Murmur    • Palpitations     2017   • Thyroid disease        Past Surgical History:   Procedure Laterality Date   • BREAST AUGMENTATION  2016   • NO PAST SURGERIES     • TUBAL ABDOMINAL LIGATION  2001       Family History   Problem Relation Age of Onset   • No Known Problems Mother    • Diabetes Father        Review of Systems   Constitutional: Positive for fatigue. Negative for unexpected weight gain and unexpected weight loss.   HENT: Negative for congestion, sinus pressure and sore throat.         Normal smell/taste   Eyes: Negative for blurred vision and visual disturbance.   Respiratory: Negative for cough, shortness of breath and wheezing.    Cardiovascular: Negative for chest pain, palpitations and leg swelling.   Gastrointestinal:  Negative for abdominal pain, constipation, diarrhea, nausea, vomiting, GERD and indigestion.   Genitourinary: Positive for menstrual problem (heavy).   Musculoskeletal: Negative for arthralgias, back pain, gait problem, joint swelling and neck pain.   Skin: Negative for rash, skin lesions and bruise.   Allergic/Immunologic: Negative for environmental allergies.   Neurological: Negative for dizziness, tremors, syncope, weakness, light-headedness, headache and memory problem.   Psychiatric/Behavioral: Positive for depressed mood and stress. Negative for sleep disturbance. The patient is nervous/anxious.        Objective   Physical Exam  Vitals and nursing note reviewed.   Constitutional:       General: She is not in acute distress.     Appearance: She is well-developed. She is obese. She is not diaphoretic.   HENT:      Head: Normocephalic and atraumatic.      Right Ear: External ear normal.      Left Ear: External ear normal.      Nose: Nose normal.   Eyes:      Conjunctiva/sclera: Conjunctivae normal.      Pupils: Pupils are equal, round, and reactive to light.   Neck:      Thyroid: No thyromegaly.   Cardiovascular:      Rate and Rhythm: Normal rate and regular rhythm.      Heart sounds: Normal heart sounds. No murmur heard.   No friction rub. No gallop.    Pulmonary:      Effort: Pulmonary effort is normal.      Breath sounds: Normal breath sounds. No wheezing.   Abdominal:      General: Bowel sounds are normal.      Palpations: Abdomen is soft.      Tenderness: There is no abdominal tenderness.   Musculoskeletal:         General: No tenderness or deformity. Normal range of motion.      Cervical back: Normal range of motion and neck supple.   Lymphadenopathy:      Cervical: No cervical adenopathy.   Skin:     General: Skin is warm and dry.      Capillary Refill: Capillary refill takes less than 2 seconds.      Findings: No rash.   Neurological:      Mental Status: She is alert and oriented to person, place, and  time.      Cranial Nerves: No cranial nerve deficit.   Psychiatric:         Behavior: Behavior normal.         Thought Content: Thought content normal.         Judgment: Judgment normal.      Comments: phq9 20         Vitals:    04/05/21 1417   BP: 113/77   Pulse: 71   Resp: 18   Temp: 97.3 °F (36.3 °C)   SpO2: 97%     Body mass index is 30.82 kg/m².    Glucose   Date Value Ref Range Status   02/18/2017 114 70 - 130 mg/dL Final     TSH   Date Value Ref Range Status   02/24/2017 2.580 0.270 - 4.200 mIU/mL Final     Results for orders placed or performed during the hospital encounter of 03/03/17   Adult Transthoracic Echo Complete   Result Value Ref Range    RV S' 10.00 cm/sec    RV Base 2.90 cm    Sinus 2.70 cm    STJ 3.00 cm    E/E' ratio 8.5     LA Volume Index 12.0 mL/m2    Lat Peak E' Don 12.0 cm/sec    Med Peak E' Don 9.00 cm/sec    RAP systole 8.0 mmHg    RVSP(TR) 31.2 mmHg    Abdo Ao Diam 1.40 cm    TAPSE (>1.6) 2.40 cm2    BSA 1.7 m^2     CV ECHO AQUILINO - BZI_BMI 28.0 kilograms/m^2     CV ECHO AQUILINO - BSA(HAYCOCK) 1.8 m^2     CV ECHO AQUILINO - BZI_METRIC_WEIGHT 69.4 kg     CV ECHO AQUILINO - BZI_METRIC_HEIGHT 157.5 cm    IVSd 0.82 cm    LVIDd 4.4 cm    LVIDs 2.8 cm    LVPWd 0.75 cm    IVS/LVPW 1.1     FS 37.9 %    EDV(Teich) 89.5 ml    ESV(Teich) 28.4 ml    EF(Teich) 68.2 %    EDV(cubed) 87.4 ml    ESV(cubed) 20.9 ml    EF(cubed) 76.1 %    LV mass(C)d 108.8 grams    LV mass(C)dI 63.8 grams/m^2    SV(Teich) 61.1 ml    SI(Teich) 35.8 ml/m^2    SV(cubed) 66.5 ml    SI(cubed) 39.0 ml/m^2    Ao root diam 2.6 cm    Ao root area 5.1 cm^2    ACS 1.8 cm    asc Aorta Diam 3.0 cm    LVOT diam 1.9 cm    LVOT area 2.7 cm^2    LVOT area(traced) 2.8 cm^2    RVOT diam 1.9 cm    RVOT area 2.8 cm^2    LVLd ap4 7.1 cm    EDV(MOD-sp4) 48.0 ml    LVLs ap4 6.3 cm    ESV(MOD-sp4) 19.0 ml    EF(MOD-sp4) 60.4 %    LVLd ap2 7.6 cm    EDV(MOD-sp2) 62.0 ml    LVLs ap2 6.6 cm    ESV(MOD-sp2) 24.0 ml    EF(MOD-sp2) 61.3 %    SV(MOD-sp4)  29.0 ml    SI(MOD-sp4) 17.0 ml/m^2    SV(MOD-sp2) 38.0 ml    SI(MOD-sp2) 22.3 ml/m^2    Ao root area (BSA corrected) 1.5     EF - Contrast (2Ch) 61.3 ml/m^2    EF - Contrast (4Ch) 60.4 ml/m^2    LV Carroll Vol (BSA corrected) 28.1 ml/m^2    LV Sys Vol (BSA corrected) 11.1 ml/m^2    MV A dur 0.1 sec    MV E max don 86.3 cm/sec    MV A max don 71.3 cm/sec    MV E/A 1.2     MV V2 max 104.2 cm/sec    MV max PG 4.3 mmHg    MV V2 mean 66.6 cm/sec    MV mean PG 2.0 mmHg    MV V2 VTI 31.4 cm    MVA(VTI) 1.8 cm^2    MV P1/2t max don 84.4 cm/sec    MV P1/2t 59.8 msec    MVA(P1/2t) 3.7 cm^2    MV dec slope 413.3 cm/sec^2    MV dec time 0.2 sec    Ao pk don 179.2 cm/sec    Ao max PG 12.8 mmHg    Ao max PG (full) 9.3 mmHg    Ao V2 mean 132.8 cm/sec    Ao mean PG 7.8 mmHg    Ao mean PG (full) 5.9 mmHg    Ao V2 VTI 37.9 cm    SANDY(I,A) 1.5 cm^2    SANDY(I,D) 1.5 cm^2    SANDY(V,A) 1.4 cm^2    SANDY(V,D) 1.4 cm^2    LV V1 max PG 3.5 mmHg    LV V1 mean PG 1.9 mmHg    LV V1 max 94.1 cm/sec    LV V1 mean 57.6 cm/sec    LV V1 VTI 20.1 cm    SV(Ao) 193.6 ml    SI(Ao) 113.5 ml/m^2    SV(LVOT) 55.2 ml    SV(RVOT) 38.3 ml    SI(LVOT) 32.3 ml/m^2    PA V2 max 94.5 cm/sec    PA max PG 3.6 mmHg    PA max PG (full) 2.1 mmHg    BH CV ECHO AQUILINO - PVA(V,A) 1.8 cm^2    BH CV ECHO AQUILINO - PVA(V,D) 1.8 cm^2    PA acc time 0.14 sec    RV V1 max PG 1.5 mmHg    RV V1 mean PG 0.8 mmHg    RV V1 max 61.6 cm/sec    RV V1 mean 40.9 cm/sec    RV V1 VTI 13.5 cm    TR max don 241.1 cm/sec    PA pr(Accel) 15.6 mmHg    Pulm Sys Don 59.2 cm/sec    Pulm Carroll Don 47.5 cm/sec    Pulm S/D 1.2     Qp/Qs 0.69     Pulm A Revs Dur 0.11 sec    Pulm A Revs Don 29.1 cm/sec    MVA P1/2T LCG 2.6 cm^2    Echo EF Estimated 60 %     *Note: Due to a large number of results and/or encounters for the requested time period, some results have not been displayed. A complete set of results can be found in Results Review.       Assessment/Plan   Diagnoses and all orders for this visit:    1.  Chronic fatigue (Primary)    2. Weight gain    3. Anxiety    4. Other depression    5. Encounter for screening mammogram for breast cancer  -     Mammo Screening Digital Tomosynthesis Bilateral With CAD; Future    Other orders  -     busPIRone (BUSPAR) 5 MG tablet; Take 1 tablet by mouth 3 (Three) Times a Day.  Dispense: 45 tablet; Refill: 0  -     buPROPion (WELLBUTRIN) 75 MG tablet; Take 1 tablet by mouth 2 (Two) Times a Day.  Dispense: 60 tablet; Refill: 1        Get copies of labs  Recommend mammogram  No need to take thyroid meds  Consult with gyn Elba Ro about hormones prescribed by 25 again  Low carb diet  Increase exercise  rx buspar for anxiety  Rx wellbutrin for depression

## 2021-04-08 ENCOUNTER — PATIENT MESSAGE (OUTPATIENT)
Dept: FAMILY MEDICINE CLINIC | Facility: CLINIC | Age: 46
End: 2021-04-08

## 2021-05-10 ENCOUNTER — PATIENT MESSAGE (OUTPATIENT)
Dept: FAMILY MEDICINE CLINIC | Facility: CLINIC | Age: 46
End: 2021-05-10

## 2021-11-23 DIAGNOSIS — R63.5 WEIGHT GAIN: Primary | ICD-10-CM

## 2022-05-11 ENCOUNTER — APPOINTMENT (OUTPATIENT)
Dept: GENERAL RADIOLOGY | Facility: HOSPITAL | Age: 47
End: 2022-05-11

## 2022-05-11 ENCOUNTER — HOSPITAL ENCOUNTER (EMERGENCY)
Facility: HOSPITAL | Age: 47
Discharge: HOME | End: 2022-05-11
Attending: EMERGENCY MEDICINE | Admitting: EMERGENCY MEDICINE

## 2022-05-11 VITALS
HEIGHT: 66 IN | BODY MASS INDEX: 28.61 KG/M2 | HEART RATE: 87 BPM | WEIGHT: 178 LBS | RESPIRATION RATE: 19 BRPM | OXYGEN SATURATION: 97 % | DIASTOLIC BLOOD PRESSURE: 84 MMHG | SYSTOLIC BLOOD PRESSURE: 112 MMHG | TEMPERATURE: 97.5 F

## 2022-05-11 DIAGNOSIS — R00.0 TACHYCARDIA: Primary | ICD-10-CM

## 2022-05-11 LAB
ALBUMIN SERPL-MCNC: 4.3 G/DL (ref 3.5–5.2)
ALBUMIN/GLOB SERPL: 1.4 G/DL
ALP SERPL-CCNC: 68 U/L (ref 39–117)
ALT SERPL W P-5'-P-CCNC: 88 U/L (ref 1–33)
ANION GAP SERPL CALCULATED.3IONS-SCNC: 11 MMOL/L (ref 5–15)
AST SERPL-CCNC: 58 U/L (ref 1–32)
BASOPHILS # BLD AUTO: 0.1 10*3/MM3 (ref 0–0.2)
BASOPHILS NFR BLD AUTO: 1 % (ref 0–1.5)
BILIRUB SERPL-MCNC: 0.3 MG/DL (ref 0–1.2)
BUN SERPL-MCNC: 13 MG/DL (ref 6–20)
BUN/CREAT SERPL: 17.3 (ref 7–25)
CALCIUM SPEC-SCNC: 8.8 MG/DL (ref 8.6–10.5)
CHLORIDE SERPL-SCNC: 105 MMOL/L (ref 98–107)
CO2 SERPL-SCNC: 21 MMOL/L (ref 22–29)
CREAT SERPL-MCNC: 0.75 MG/DL (ref 0.57–1)
DEPRECATED RDW RBC AUTO: 44.2 FL (ref 37–54)
EGFRCR SERPLBLD CKD-EPI 2021: 99.6 ML/MIN/1.73
EOSINOPHIL # BLD AUTO: 0.2 10*3/MM3 (ref 0–0.4)
EOSINOPHIL NFR BLD AUTO: 3.5 % (ref 0.3–6.2)
ERYTHROCYTE [DISTWIDTH] IN BLOOD BY AUTOMATED COUNT: 13.7 % (ref 12.3–15.4)
GLOBULIN UR ELPH-MCNC: 3.1 GM/DL
GLUCOSE SERPL-MCNC: 110 MG/DL (ref 65–99)
HCT VFR BLD AUTO: 40.4 % (ref 34–46.6)
HGB BLD-MCNC: 13.8 G/DL (ref 12–15.9)
LYMPHOCYTES # BLD AUTO: 1.3 10*3/MM3 (ref 0.7–3.1)
LYMPHOCYTES NFR BLD AUTO: 21.5 % (ref 19.6–45.3)
MAGNESIUM SERPL-MCNC: 2.2 MG/DL (ref 1.6–2.6)
MCH RBC QN AUTO: 31.3 PG (ref 26.6–33)
MCHC RBC AUTO-ENTMCNC: 34.2 G/DL (ref 31.5–35.7)
MCV RBC AUTO: 91.7 FL (ref 79–97)
MONOCYTES # BLD AUTO: 0.3 10*3/MM3 (ref 0.1–0.9)
MONOCYTES NFR BLD AUTO: 5.7 % (ref 5–12)
NEUTROPHILS NFR BLD AUTO: 4.1 10*3/MM3 (ref 1.7–7)
NEUTROPHILS NFR BLD AUTO: 68.3 % (ref 42.7–76)
NRBC BLD AUTO-RTO: 0.1 /100 WBC (ref 0–0.2)
PLATELET # BLD AUTO: 307 10*3/MM3 (ref 140–450)
PMV BLD AUTO: 7.7 FL (ref 6–12)
POTASSIUM SERPL-SCNC: 3.9 MMOL/L (ref 3.5–5.2)
PROT SERPL-MCNC: 7.4 G/DL (ref 6–8.5)
RBC # BLD AUTO: 4.41 10*6/MM3 (ref 3.77–5.28)
SODIUM SERPL-SCNC: 137 MMOL/L (ref 136–145)
TSH SERPL DL<=0.05 MIU/L-ACNC: 2.6 UIU/ML (ref 0.27–4.2)
WBC NRBC COR # BLD: 6 10*3/MM3 (ref 3.4–10.8)

## 2022-05-11 PROCEDURE — 71045 X-RAY EXAM CHEST 1 VIEW: CPT

## 2022-05-11 PROCEDURE — 85025 COMPLETE CBC W/AUTO DIFF WBC: CPT | Performed by: EMERGENCY MEDICINE

## 2022-05-11 PROCEDURE — 83735 ASSAY OF MAGNESIUM: CPT | Performed by: EMERGENCY MEDICINE

## 2022-05-11 PROCEDURE — 84443 ASSAY THYROID STIM HORMONE: CPT | Performed by: EMERGENCY MEDICINE

## 2022-05-11 PROCEDURE — 93005 ELECTROCARDIOGRAM TRACING: CPT

## 2022-05-11 PROCEDURE — 99284 EMERGENCY DEPT VISIT MOD MDM: CPT

## 2022-05-11 PROCEDURE — 93005 ELECTROCARDIOGRAM TRACING: CPT | Performed by: EMERGENCY MEDICINE

## 2022-05-11 PROCEDURE — 80053 COMPREHEN METABOLIC PANEL: CPT | Performed by: EMERGENCY MEDICINE

## 2022-05-11 RX ORDER — SODIUM CHLORIDE 0.9 % (FLUSH) 0.9 %
10 SYRINGE (ML) INJECTION AS NEEDED
Status: DISCONTINUED | OUTPATIENT
Start: 2022-05-11 | End: 2022-05-11 | Stop reason: HOSPADM

## 2022-05-11 RX ADMIN — SODIUM CHLORIDE 500 ML: 9 INJECTION, SOLUTION INTRAVENOUS at 12:42

## 2022-05-11 NOTE — ED PROVIDER NOTES
Subjective   History of Present Illness  47-year-old female states that for the past 2 days she has felt jittery with episodes of her heart racing and states that this morning that she was sitting in a car and her heart rate went up to 140.  The patient has recently started taking about 9 different supplements from a suggestion by a  at the gym.  The patient states that she works out 5 days a week and just started doing that this week although today is a rest day.  Patient denies any other medical problems.  Last menstrual cycle was last month patient is not trying to get pregnant.  Review of Systems  No history of heart disease thyroid disease or previous episodes of palpitations.  No fever chills cough or congestion.  No past medical history on file.    No Known Allergies    No past surgical history on file.    No family history on file.    Social History     Socioeconomic History   • Marital status:            Objective   Physical Exam  Patient is awake and alert but very anxious she is afebrile her heart rate at triage was 108 but when I saw her it was 80 pressures 152/97 and she is afebrile her sats are 97% HEENT exam is unremarkable neck is supple without JVD there is no thyromegaly the patient's chest is clear cardiovascular exam reveals a regular rhythm at a rate of 84 no murmur was heard abdomen was soft nontender she has no cyanosis clubbing or edema negative Homans neurologic exam is normal  Procedures           ED Course            Results for orders placed or performed during the hospital encounter of 05/11/22   Comprehensive Metabolic Panel    Specimen: Arm, Right; Blood   Result Value Ref Range    Glucose 110 (H) 65 - 99 mg/dL    BUN 13 6 - 20 mg/dL    Creatinine 0.75 0.57 - 1.00 mg/dL    Sodium 137 136 - 145 mmol/L    Potassium 3.9 3.5 - 5.2 mmol/L    Chloride 105 98 - 107 mmol/L    CO2 21.0 (L) 22.0 - 29.0 mmol/L    Calcium 8.8 8.6 - 10.5 mg/dL    Total Protein 7.4 6.0 - 8.5 g/dL     Albumin 4.30 3.50 - 5.20 g/dL    ALT (SGPT) 88 (H) 1 - 33 U/L    AST (SGOT) 58 (H) 1 - 32 U/L    Alkaline Phosphatase 68 39 - 117 U/L    Total Bilirubin 0.3 0.0 - 1.2 mg/dL    Globulin 3.1 gm/dL    A/G Ratio 1.4 g/dL    BUN/Creatinine Ratio 17.3 7.0 - 25.0    Anion Gap 11.0 5.0 - 15.0 mmol/L    eGFR 99.6 >60.0 mL/min/1.73   TSH    Specimen: Arm, Right; Blood   Result Value Ref Range    TSH 2.600 0.270 - 4.200 uIU/mL   Magnesium    Specimen: Arm, Right; Blood   Result Value Ref Range    Magnesium 2.2 1.6 - 2.6 mg/dL   CBC Auto Differential    Specimen: Arm, Right; Blood   Result Value Ref Range    WBC 6.00 3.40 - 10.80 10*3/mm3    RBC 4.41 3.77 - 5.28 10*6/mm3    Hemoglobin 13.8 12.0 - 15.9 g/dL    Hematocrit 40.4 34.0 - 46.6 %    MCV 91.7 79.0 - 97.0 fL    MCH 31.3 26.6 - 33.0 pg    MCHC 34.2 31.5 - 35.7 g/dL    RDW 13.7 12.3 - 15.4 %    RDW-SD 44.2 37.0 - 54.0 fl    MPV 7.7 6.0 - 12.0 fL    Platelets 307 140 - 450 10*3/mm3    Neutrophil % 68.3 42.7 - 76.0 %    Lymphocyte % 21.5 19.6 - 45.3 %    Monocyte % 5.7 5.0 - 12.0 %    Eosinophil % 3.5 0.3 - 6.2 %    Basophil % 1.0 0.0 - 1.5 %    Neutrophils, Absolute 4.10 1.70 - 7.00 10*3/mm3    Lymphocytes, Absolute 1.30 0.70 - 3.10 10*3/mm3    Monocytes, Absolute 0.30 0.10 - 0.90 10*3/mm3    Eosinophils, Absolute 0.20 0.00 - 0.40 10*3/mm3    Basophils, Absolute 0.10 0.00 - 0.20 10*3/mm3    nRBC 0.1 0.0 - 0.2 /100 WBC   ECG 12 Lead   Result Value Ref Range    QT Interval 369 ms     Medications   sodium chloride 0.9 % flush 10 mL (has no administration in time range)   sodium chloride 0.9 % bolus 500 mL (500 mL Intravenous New Bag 5/11/22 1242)     XR Chest 1 View    Result Date: 5/11/2022  No active cardiopulmonary disease  Electronically Signed By-Juve Rios On:5/11/2022 12:49 PM This report was finalized on [id number] by  Juve Rios, .                                            MDM  I saw no significant abnormalities on the patient's lab work x-ray and EKG.  I  think that her tachycardia could be related to the multiple supplements that she is taking as well as the fact that she has started out working out for an hour and a half 5 times a week.  I think she needs to hold the supplements for right now and decrease her workouts to 45 minutes each and then gradually increase  Final diagnoses:   Tachycardia       ED Disposition  ED Disposition     None          No follow-up provider specified.       Medication List      No changes were made to your prescriptions during this visit.          Zenon Ernandez MD  05/11/22 1552

## 2022-05-11 NOTE — DISCHARGE INSTRUCTIONS
Stop taking your supplements for the next 3 days.  Your workouts should be approximately 45 minutes and gradually increase in length over the course of the next 2 to 3 weeks.  Follow-up with your doctor as needed.

## 2022-05-17 LAB — QT INTERVAL: 369 MS

## 2022-06-13 ENCOUNTER — TELEPHONE (OUTPATIENT)
Dept: ENDOCRINOLOGY | Facility: CLINIC | Age: 47
End: 2022-06-13

## 2022-06-13 NOTE — TELEPHONE ENCOUNTER
Attempted to contact pt to reschedule her 6/14 appt with Dr. Lowery. Appt moved to 6/21 @ 230. Lm on pts vm to call back asap to confirm if this appt will be ok or if we need to reschedule.

## 2022-06-24 NOTE — TELEPHONE ENCOUNTER
Attempted to contact pt to reschedule her NP appt. Cornel on pts vm &  answered mobile number listed. He will give her message to call the office.

## 2022-09-07 ENCOUNTER — HOSPITAL ENCOUNTER (EMERGENCY)
Facility: HOSPITAL | Age: 47
Discharge: HOME | End: 2022-09-07
Attending: EMERGENCY MEDICINE | Admitting: EMERGENCY MEDICINE

## 2022-09-07 VITALS
WEIGHT: 178 LBS | TEMPERATURE: 98.2 F | HEIGHT: 62 IN | SYSTOLIC BLOOD PRESSURE: 110 MMHG | HEART RATE: 76 BPM | OXYGEN SATURATION: 96 % | RESPIRATION RATE: 16 BRPM | BODY MASS INDEX: 32.76 KG/M2 | DIASTOLIC BLOOD PRESSURE: 72 MMHG

## 2022-09-07 DIAGNOSIS — E87.8 ELECTROLYTE ABNORMALITY: Primary | ICD-10-CM

## 2022-09-07 LAB
ALBUMIN SERPL-MCNC: 3.84 G/DL (ref 3.5–5.2)
ALBUMIN SERPL-MCNC: 4.14 G/DL (ref 3.5–5.2)
ALBUMIN/GLOB SERPL: 1.5 G/DL
ALBUMIN/GLOB SERPL: 1.6 G/DL
ALP SERPL-CCNC: 64 U/L (ref 39–117)
ALP SERPL-CCNC: 69 U/L (ref 39–117)
ALT SERPL W P-5'-P-CCNC: 115 U/L (ref 1–33)
ALT SERPL W P-5'-P-CCNC: 124 U/L (ref 1–33)
ANION GAP SERPL CALCULATED.3IONS-SCNC: 7.7 MMOL/L (ref 5–15)
ANION GAP SERPL CALCULATED.3IONS-SCNC: 9.8 MMOL/L (ref 5–15)
AST SERPL-CCNC: 68 U/L (ref 1–32)
AST SERPL-CCNC: 72 U/L (ref 1–32)
B-HCG UR QL: NEGATIVE
BASOPHILS # BLD AUTO: 0.05 10*3/MM3 (ref 0–0.2)
BASOPHILS NFR BLD AUTO: 1 % (ref 0–1.5)
BILIRUB SERPL-MCNC: 0.5 MG/DL (ref 0–1.2)
BILIRUB SERPL-MCNC: 0.5 MG/DL (ref 0–1.2)
BUN SERPL-MCNC: 8 MG/DL (ref 6–20)
BUN SERPL-MCNC: 9 MG/DL (ref 6–20)
BUN/CREAT SERPL: 12.1 (ref 7–25)
BUN/CREAT SERPL: 12.9 (ref 7–25)
CALCIUM SPEC-SCNC: 8.5 MG/DL (ref 8.6–10.5)
CALCIUM SPEC-SCNC: 8.9 MG/DL (ref 8.6–10.5)
CHLORIDE SERPL-SCNC: 100 MMOL/L (ref 98–107)
CHLORIDE SERPL-SCNC: 104 MMOL/L (ref 98–107)
CO2 SERPL-SCNC: 25.2 MMOL/L (ref 22–29)
CO2 SERPL-SCNC: 25.3 MMOL/L (ref 22–29)
CREAT SERPL-MCNC: 0.66 MG/DL (ref 0.57–1)
CREAT SERPL-MCNC: 0.7 MG/DL (ref 0.57–1)
DEPRECATED RDW RBC AUTO: 41.4 FL (ref 37–54)
EGFRCR SERPLBLD CKD-EPI 2021: 108.2 ML/MIN/1.73
EGFRCR SERPLBLD CKD-EPI 2021: 109.7 ML/MIN/1.73
EOSINOPHIL # BLD AUTO: 0.16 10*3/MM3 (ref 0–0.4)
EOSINOPHIL NFR BLD AUTO: 3.3 % (ref 0.3–6.2)
ERYTHROCYTE [DISTWIDTH] IN BLOOD BY AUTOMATED COUNT: 12.4 % (ref 12.3–15.4)
GLOBULIN UR ELPH-MCNC: 2.5 GM/DL
GLOBULIN UR ELPH-MCNC: 2.8 GM/DL
GLUCOSE SERPL-MCNC: 113 MG/DL (ref 65–99)
GLUCOSE SERPL-MCNC: 138 MG/DL (ref 65–99)
HCT VFR BLD AUTO: 38 % (ref 34–46.6)
HGB BLD-MCNC: 13.1 G/DL (ref 12–15.9)
IMM GRANULOCYTES # BLD AUTO: 0.01 10*3/MM3 (ref 0–0.05)
IMM GRANULOCYTES NFR BLD AUTO: 0.2 % (ref 0–0.5)
LYMPHOCYTES # BLD AUTO: 1.75 10*3/MM3 (ref 0.7–3.1)
LYMPHOCYTES NFR BLD AUTO: 36.1 % (ref 19.6–45.3)
MCH RBC QN AUTO: 30.8 PG (ref 26.6–33)
MCHC RBC AUTO-ENTMCNC: 34.5 G/DL (ref 31.5–35.7)
MCV RBC AUTO: 89.4 FL (ref 79–97)
MONOCYTES # BLD AUTO: 0.31 10*3/MM3 (ref 0.1–0.9)
MONOCYTES NFR BLD AUTO: 6.4 % (ref 5–12)
NEUTROPHILS NFR BLD AUTO: 2.57 10*3/MM3 (ref 1.7–7)
NEUTROPHILS NFR BLD AUTO: 53 % (ref 42.7–76)
PLATELET # BLD AUTO: 300 10*3/MM3 (ref 140–450)
PMV BLD AUTO: 9.6 FL (ref 6–12)
POTASSIUM SERPL-SCNC: 3.8 MMOL/L (ref 3.5–5.2)
POTASSIUM SERPL-SCNC: 3.9 MMOL/L (ref 3.5–5.2)
PROT SERPL-MCNC: 6.3 G/DL (ref 6–8.5)
PROT SERPL-MCNC: 6.9 G/DL (ref 6–8.5)
RBC # BLD AUTO: 4.25 10*6/MM3 (ref 3.77–5.28)
SODIUM SERPL-SCNC: 135 MMOL/L (ref 136–145)
SODIUM SERPL-SCNC: 137 MMOL/L (ref 136–145)
WBC NRBC COR # BLD: 4.85 10*3/MM3 (ref 3.4–10.8)

## 2022-09-07 PROCEDURE — 80053 COMPREHEN METABOLIC PANEL: CPT | Performed by: EMERGENCY MEDICINE

## 2022-09-07 PROCEDURE — 99282 EMERGENCY DEPT VISIT SF MDM: CPT | Performed by: EMERGENCY MEDICINE

## 2022-09-07 PROCEDURE — 93005 ELECTROCARDIOGRAM TRACING: CPT | Performed by: EMERGENCY MEDICINE

## 2022-09-07 PROCEDURE — 81025 URINE PREGNANCY TEST: CPT | Performed by: EMERGENCY MEDICINE

## 2022-09-07 PROCEDURE — 99283 EMERGENCY DEPT VISIT LOW MDM: CPT

## 2022-09-07 PROCEDURE — 93010 ELECTROCARDIOGRAM REPORT: CPT | Performed by: EMERGENCY MEDICINE

## 2022-09-07 PROCEDURE — 36415 COLL VENOUS BLD VENIPUNCTURE: CPT

## 2022-09-07 PROCEDURE — 85025 COMPLETE CBC W/AUTO DIFF WBC: CPT | Performed by: EMERGENCY MEDICINE

## 2022-09-07 RX ADMIN — SODIUM CHLORIDE 1000 ML: 9 INJECTION, SOLUTION INTRAVENOUS at 10:49

## 2022-09-07 NOTE — ED PROVIDER NOTES
Subjective   PIT    47-year-old female took 1 pill of 400 mg magnesium last night to try to help her sleep she states that after that she started to have indigestion and mild abdominal cramping with mild diarrhea she is also had mild leg cramping since then.  No chest pain or shortness of breath patient has history of anxiety feels like this could be part of the problems to her she was worried about many things when she tried to sleep but she is not sure if it was that or the magnesium that caused these problems.  Patient has had no vomiting no blood in stool only had 1 episode of diarrhea patient feels nearly back to normal now with no symptoms other than mild leg cramping.  No heart palpitations no other medications tried.          Review of Systems   Constitutional: Negative for activity change, appetite change, chills, diaphoresis, fatigue and fever.   HENT: Negative for congestion, dental problem, drooling, hearing loss, nosebleeds, postnasal drip, rhinorrhea, sinus pressure, sinus pain, sneezing, sore throat, tinnitus and trouble swallowing.    Eyes: Negative for photophobia, pain, discharge, redness, itching and visual disturbance.   Respiratory: Negative for apnea, cough, choking, chest tightness, shortness of breath, wheezing and stridor.    Cardiovascular: Negative for chest pain, palpitations and leg swelling.   Gastrointestinal: Negative for abdominal distention, abdominal pain, blood in stool, constipation, diarrhea, nausea, rectal pain and vomiting.   Endocrine: Negative for cold intolerance, heat intolerance and polydipsia.   Genitourinary: Negative for difficulty urinating, dysuria, enuresis, flank pain and frequency.   Musculoskeletal: Negative for arthralgias, back pain, gait problem, joint swelling and myalgias.   Skin: Negative for color change, pallor and rash.   Allergic/Immunologic: Negative for environmental allergies and food allergies.   Neurological: Negative for dizziness, seizures,  facial asymmetry, speech difficulty, light-headedness, numbness and headaches.   Hematological: Negative for adenopathy. Does not bruise/bleed easily.   Psychiatric/Behavioral: Negative for agitation, behavioral problems, decreased concentration, dysphoric mood, hallucinations and self-injury. The patient is not nervous/anxious.    All other systems reviewed and are negative.      Past Medical History:   Diagnosis Date   • Anxiety    • Gestational diabetes 2001   • Murmur    • Palpitations     2/18/2017   • Thyroid disease        No Known Allergies    Past Surgical History:   Procedure Laterality Date   • BREAST AUGMENTATION  03/18/2016   • NO PAST SURGERIES     • TUBAL ABDOMINAL LIGATION  03/28/2001       Family History   Problem Relation Age of Onset   • No Known Problems Mother    • Diabetes Father        Social History     Socioeconomic History   • Marital status:    Tobacco Use   • Smoking status: Former Smoker   • Smokeless tobacco: Never Used   • Tobacco comment: she just occ smoked over the years when she felt she needed it   Substance and Sexual Activity   • Alcohol use: No   • Drug use: No           Objective   Physical Exam  Vitals and nursing note reviewed.   Constitutional:       General: She is not in acute distress.     Appearance: Normal appearance. She is not ill-appearing, toxic-appearing or diaphoretic.   HENT:      Head: Normocephalic and atraumatic.      Right Ear: Tympanic membrane, ear canal and external ear normal. There is no impacted cerumen.      Left Ear: Tympanic membrane, ear canal and external ear normal. There is no impacted cerumen.      Nose: Nose normal. No congestion or rhinorrhea.      Mouth/Throat:      Mouth: Mucous membranes are moist.      Pharynx: Oropharynx is clear. No oropharyngeal exudate or posterior oropharyngeal erythema.   Eyes:      General: No scleral icterus.        Right eye: No discharge.         Left eye: No discharge.      Conjunctiva/sclera:  Conjunctivae normal.      Pupils: Pupils are equal, round, and reactive to light.   Neck:      Vascular: No carotid bruit.   Cardiovascular:      Rate and Rhythm: Normal rate and regular rhythm.      Pulses: Normal pulses.      Heart sounds: Normal heart sounds. No murmur heard.    No friction rub. No gallop.   Pulmonary:      Effort: Pulmonary effort is normal. No respiratory distress.      Breath sounds: Normal breath sounds. No stridor. No wheezing, rhonchi or rales.   Chest:      Chest wall: No tenderness.   Abdominal:      General: Abdomen is flat. Bowel sounds are normal. There is no distension.      Palpations: Abdomen is soft. There is no mass.      Tenderness: There is no abdominal tenderness. There is no right CVA tenderness, left CVA tenderness, guarding or rebound.      Hernia: No hernia is present.   Musculoskeletal:         General: No swelling, tenderness, deformity or signs of injury. Normal range of motion.      Cervical back: Normal range of motion and neck supple. No rigidity or tenderness.      Right lower leg: No edema.      Left lower leg: No edema.   Lymphadenopathy:      Cervical: No cervical adenopathy.   Skin:     General: Skin is warm and dry.      Capillary Refill: Capillary refill takes less than 2 seconds.      Coloration: Skin is not jaundiced or pale.      Findings: No bruising, erythema, lesion or rash.   Neurological:      General: No focal deficit present.      Mental Status: She is alert and oriented to person, place, and time. Mental status is at baseline.      Cranial Nerves: No cranial nerve deficit.      Sensory: No sensory deficit.      Motor: No weakness.      Coordination: Coordination normal.      Gait: Gait normal.      Deep Tendon Reflexes: Reflexes normal.   Psychiatric:         Mood and Affect: Mood normal.         Behavior: Behavior normal.         Thought Content: Thought content normal.         Judgment: Judgment normal.         ECG 12 Lead      Date/Time: 9/7/2022  11:05 AM  Performed by: Jonas Bray MD  Authorized by: Jonas Bray MD   Interpreted by physician  Comparison: compared with previous ECG   Similar to previous ECG  Rhythm: sinus rhythm                 ED Course                                           MDM  Number of Diagnoses or Management Options  Electrolyte abnormality  Diagnosis management comments: Likely side effect of magnesium however given that patient only ingested a maximum of 400 mg and does not need other high magnesium diet is unlikely that she has magnesium toxicity unable to check magnesium level here in the ER I have offered to do send out lab for the patient although we will not have an immediate result patient declines despite risks.  We will check other electrolytes with CMP give IV fluid and check EKG.    1300: Patient feeling much better no current symptoms no acute findings on labs mild liver elevations likely due to dehydration on CMP originally that have resolved on repeat CMP I have offered patient further work-up including abdominal CT scan and ultrasound to check on reason for liver elevation patient declines despite risk extensive education done patient will see primary care doctor for repeat LFTs to ensure complete resolution extensive counseling done patient is ANO x4 voices understanding also counseled not to take magnesium supplement and more the consult physician before taking any type of supplements or medications.       Amount and/or Complexity of Data Reviewed  Clinical lab tests: reviewed        Final diagnoses:   Electrolyte abnormality       ED Disposition  ED Disposition     ED Disposition   Discharge    Condition   Stable    Comment   Patient given discharge instructions and verbalized understanding. Discharged home with friend.             Carmen Dumont MD  [address]  [facility] [address]  [facility] [id number] [phone]               Medication List      No changes were made to your prescriptions during  this visit.          Jonas Bray MD  09/07/22 0195

## 2022-09-09 LAB — QT INTERVAL: 391 MS

## 2024-04-15 NOTE — ED NOTES
DAVID CASE WHILE SHOPPING AT THE [facility]ALL. EMS REPORT HYPERVENTILATION PRESENT UPON THEIR ARRIVAL. PT IS NOW CALM AND REPORTS FEELING BETTER.     Guerda Clayton RN  02/18/17 [id number]     Unable to care for self